# Patient Record
(demographics unavailable — no encounter records)

---

## 2025-01-08 NOTE — PROCEDURE
[FreeTextEntry3] : Large joint injection was performed of the left knee. An injection of Lidocaine 3cc of 1% , Bupivacaine (Marcaine) 6cc of 0.5% , Triamcinolone (Kenalog) 2cc of 40 mg  was used.  Patient was advised to call if redness, pain or fever occur and apply ice for 15 minutes out of every hour for the next 12-24 hours as tolerated.   Patient has tried OTC's including aspirin, Ibuprofen, Aleve, etc or prescription NSAIDS, and/or exercises at home and/or physical therapy without satisfactory response, patient had decreased mobility in the joint and the risks benefits, and alternatives have been discussed, and verbal consent was obtained.  The site was prepped with alcohol, betadine and ethyl chloride sprayed topically  The risks, benefits and contents of the injection have been discussed.  Risks include but are not limited to allergic reaction, flare reaction, permanent white skin discoloration at the injection site and infection.  The patient understands the risks and agrees to having the injection.  All questions have been answered.

## 2025-01-08 NOTE — DISCUSSION/SUMMARY
[de-identified] : 71f with acute exacerbation of left knee djd.  The patient was advised of the diagnosis. The natural history of the pathology was explained in full to the patient in layman's terms. All questions were answered. The risks and benefits of surgical and non-surgical treatment alternatives were explained in full to the patient.  1) csi left knee today - tolerated well 2) discussed availability of visco injections and pt would like to proceed.  3) cryotherapy, rest and activity modification  4) will rtc after an upcoming trip for Euflexxa injections    Entered by Mary Auguste acting as scribe. Dr. Delong- The documentation recorded by the scribe accurately reflects the service I personally performed and the decisions made by me.

## 2025-01-08 NOTE — PHYSICAL EXAM
[Left] : left knee [NL (140)] : flexion 140 degrees [NL (0)] : extension 0 degrees [Negative] : negative Shaniqua's [] : no pain with varus stress

## 2025-01-08 NOTE — HISTORY OF PRESENT ILLNESS
[Work related] : work related [Sudden] : sudden [5] : 5 [Dull/Aching] : dull/aching [Throbbing] : throbbing [Intermittent] : intermittent [Leisure] : leisure [Work] : work [Part time] : Work status: part time [de-identified] : 1/8/25: Here to f/up left knee. States her  case is now closed so she will be under her insurance moving forward for the left knee. Knee pain has persisted since last visit with no new JERZY. Has not been to PT.    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 C/o continued left knee pain and stiffness. Pain limits her walking distance and activity levels.  5/7/24: Here to f/up left knee. CSI at last visit with relief. Attending PT and wearing playmaker brace which she finds to be helpful.  3/7/24: Here to f/up left knee and left ankle. Attending PT. Feels that she has seen good improvement with her left ankle pain. c/o persistent left knee pain, swelling and instability.  01/25/2024 Ms. ELIDIA MORALES, a 70 year old female, presents today for left knee / left ankle.  In parking lot at work on 12/18/23, tripped over the curb and fell. She had pain in the ankle and knee. Hx of knee osteoarthritis and has been treated in the last year and had been feeling well until the fall. Had XR done @ [facility] and were negative for fx and was told she had an ankle sprain. Still aching with her bending, steps or when getting up from seated position.   [] : no [FreeTextEntry1] : LT Knee and Lt ankle [FreeTextEntry3] : 12/18/23 [FreeTextEntry5] : follow up visit. needs new PT rx today to started going again. wearing playmaker, reports more swelling of LT knee and feels like it is going to buckle often. Also reports some numbness of lower leg and LT hip discomfort for about one month.  [de-identified] : 11/2023 [de-identified] : TANESHA

## 2025-01-22 NOTE — HISTORY OF PRESENT ILLNESS
[Lower back] : lower back [Gradual] : gradual [4] : 4 [] : yes [Intermittent] : intermittent [Household chores] : household chores [Sleep] : sleep [Rest] : rest [Physical therapy] : physical therapy [Sitting] : sitting [de-identified] : 10/18/23:  70 F HERE TODAY WITH LOWER SPINE PAIN DOWN THE LEFT LEG - has had sciatica in some sense for 30+ years  PT WENT TO ER BACK IN Jan 2022 - was told sciatica and given MUSCLE RELAXER WHICH HELPS  had an MRi (at [facility]) in 2022 and was disc herniatoin and was advised PT  PT HAS ALSO TRY PT WITH SOME RELIEF but HAVING persistent NUMBNESS ON THE LEFT LEG   xrays today: l spine - lumbar scoliosis 28 degrees, spondylosis  AP PELVIS-  negative   SAw Dr torrez and had an MRI of the left knee   no prior spine surgery tried chiro with some relief  no prior pain injectoins  no gabapentin  no emg/ncs   retired but works 3 days a week   11/01/23 here to review mri results on the lower spine, no changes - plan at last was "reviewed the case and the imaging with her  left lumbar radiculopathy  indicated for updated MRi L spine   fu to review" - overall the pain remains in the lower back and down the left leg - right leg is okay uses tylenol with advil   MRI L-spine 10/25/23 See attached report she was aware of kidney cyst  ----------------------------------------------------------------------------------  1.22.25 Patient here for lower back pain. Reports sciatica pain down the left leg that worsened over the last few months without injury. There is tingling into the toes in the left foot.  Has been taking tylenol as the advil has raised her BP.    had an injection with Dr Torrez in the left knee   There is also pain to the neck that started in 12/2024. She wakes up with the back of her neck hurting her and has a headache. No radiating pain into the arms.  She saw me over a year ago and was prescribed gabapentin but didnt take it   No prior neck issues  No new mRI  xrays today C-spine 4v - cervical spondylosis  L-spine 4v - lumbar scoliosis and spondylosis  AP pelvis - no visible fractures  [FreeTextEntry5] : NO INJURY  [FreeTextEntry7] : LEFT LEG  [de-identified] : MRI DONE AT [facility] ,mri done at [facility]  [de-identified] : PT

## 2025-01-22 NOTE — PHYSICAL EXAM
[Left lower extremity below knee] : left lower extremity below knee [] : negative Spurling [FreeTextEntry8] : tender to the back of the neck

## 2025-01-22 NOTE — DISCUSSION/SUMMARY
[Medication Risks Reviewed] : Medication risks reviewed [de-identified] : reviewed the case and the imaging with her neck and lower back issues indicated for MRi of both TPI toelrated well today PT script  fu to reveiw the MRis

## 2025-01-22 NOTE — PROCEDURE
[Trigger point 1-2 muscle groups] : trigger point 1-2 muscle groups [Left] : of the left [Lumbar paraspinal muscle] : lumbar paraspinal muscle [Pain] : pain [Alcohol] : alcohol [Betadine] : betadine [___ cc    1%] : Lidocaine ~Vcc of 1%  [___ cc    0.25%] : Bupivacaine (Marcaine) ~Vcc of 0.25%  [___ cc    10mg] : Triamcinolone (Kenalog) ~Vcc of 10 mg  [FreeTextEntry3] : the pain was 8 before and 4 after the injection

## 2025-02-05 NOTE — DISCUSSION/SUMMARY
[Medication Risks Reviewed] : Medication risks reviewed [de-identified] : reviewed the case and the imaging with her  neck and lower back issues cont  reviewed MRIs of both cont with PT - we can refill the script if needed

## 2025-02-05 NOTE — HISTORY OF PRESENT ILLNESS
[Lower back] : lower back [Gradual] : gradual [4] : 4 [] : yes [Intermittent] : intermittent [Household chores] : household chores [Rest] : rest [Sleep] : sleep [Physical therapy] : physical therapy [Sitting] : sitting [de-identified] : 10/18/23:  70 F HERE TODAY WITH LOWER SPINE PAIN DOWN THE LEFT LEG - has had sciatica in some sense for 30+ years  PT WENT TO ER BACK IN Jan 2022 - was told sciatica and given MUSCLE RELAXER WHICH HELPS  had an MRi (at [facility]) in 2022 and was disc herniatoin and was advised PT  PT HAS ALSO TRY PT WITH SOME RELIEF but HAVING persistent NUMBNESS ON THE LEFT LEG   xrays today: l spine - lumbar scoliosis 28 degrees, spondylosis  AP PELVIS-  negative   SAw Dr torrez and had an MRI of the left knee   no prior spine surgery tried chiro with some relief  no prior pain injectoins  no gabapentin  no emg/ncs   retired but works 3 days a week   11/01/23 here to review mri results on the lower spine, no changes - plan at last was "reviewed the case and the imaging with her  left lumbar radiculopathy  indicated for updated MRi L spine   fu to review" - overall the pain remains in the lower back and down the left leg - right leg is okay uses tylenol with advil   MRI L-spine 10/25/23 See attached report she was aware of kidney cyst  ----------------------------------------------------------------------------------  1.22.25 Patient here for lower back pain. Reports sciatica pain down the left leg that worsened over the last few months without injury. There is tingling into the toes in the left foot.  Has been taking tylenol as the advil has raised her BP.    had an injection with Dr Torrez in the left knee   There is also pain to the neck that started in 12/2024. She wakes up with the back of her neck hurting her and has a headache. No radiating pain into the arms.  She saw me over a year ago and was prescribed gabapentin but didnt take it   No prior neck issues  No new mRI  xrays today C-spine 4v - cervical spondylosis  L-spine 4v - lumbar scoliosis and spondylosis  AP pelvis - no visible fractures   02.05.2025: Patient present today for follow-up of the neck and lower back. Underwent MRI and is here to discuss the imaging results. Since last visit, pt has been feeling the same. neck pain radiating to the left shoulder   MRi C and L spine - see reports - [facility] by my read - C spine - mild spondylosis L spine - mild to mod diffuse spondylosis  injectoins and steroids helped a bit has done some PT  [FreeTextEntry5] : NO INJURY  [FreeTextEntry7] : LEFT LEG  [de-identified] : MRI DONE AT [facility] ,mri done at [facility]  [de-identified] : PT

## 2025-04-22 NOTE — PROCEDURE
[FreeTextEntry3] : Large joint injection was performed  of the left knee. The indication for this procedure was x-ray evidence of Osteoarthritis on this or prior visit. The site was prepped with alcohol and betadine. An injection of Lidocaine 3cc of 1% , Euflexxa 20mg, # 1 was used.   Patient was advised to call if redness, pain or fever occur and apply ice for 15 minutes out of every hour for the next 12-24 hours as tolerated.   Patient has tried OTC's including aspirin, Ibuprofen, Aleve, etc or prescription NSAIDS, and/or exercises at home and/or physical therapy without satisfactory response, patient had decreased mobility in the joint and the risks benefits, and alternatives have been discussed, and verbal consent was obtained.   The risks, benefits and contents of the injection have been discussed.  Risks include but are not limited to allergic reaction, flare reaction, permanent white skin discoloration at the injection site and infection.  The patient understands the risks and agrees to having the injection.  All questions have been answered.

## 2025-04-22 NOTE — HISTORY OF PRESENT ILLNESS
[Work related] : work related [Sudden] : sudden [5] : 5 [Dull/Aching] : dull/aching [Throbbing] : throbbing [Intermittent] : intermittent [Leisure] : leisure [Work] : work [Part time] : Work status: part time [de-identified] : 4/22/25: Here to f/u L knee. Pain has remained the same since last visit and increases at times. Experiencing weakness and swelling with the left knee. CSI last visit helped temporarily. N/t from left knee down to L toes. Completed PT and has continued with HEP.  1/8/25: Here to f/up left knee. States her  case is now closed so she will be under her insurance moving forward for the left knee. Knee pain has persisted since last visit with no new JERZY. Has not been to PT.    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 C/o continued left knee pain and stiffness. Pain limits her walking distance and activity levels.  5/7/24: Here to f/up left knee. CSI at last visit with relief. Attending PT and wearing playmaker brace which she finds to be helpful.  3/7/24: Here to f/up left knee and left ankle. Attending PT. Feels that she has seen good improvement with her left ankle pain. c/o persistent left knee pain, swelling and instability.  01/25/2024 Ms. ELIDIA MORALES, a 70 year old female, presents today for left knee / left ankle.  In parking lot at work on 12/18/23, tripped over the curb and fell. She had pain in the ankle and knee. Hx of knee osteoarthritis and has been treated in the last year and had been feeling well until the fall. Had XR done @ [facility] and were negative for fx and was told she had an ankle sprain. Still aching with her bending, steps or when getting up from seated position.   [] : no [FreeTextEntry1] : LT Knee and Lt ankle [FreeTextEntry3] : 12/18/23

## 2025-04-22 NOTE — DISCUSSION/SUMMARY
[de-identified] : 71f with acute exacerbation of left knee djd.  Arthritis is a progressive problem that once occurs will be a chronic issue that will likely continue until surgical treatment is necessary. Treatment options for arthritis include OTC NSAIDS, prescription NSAIDS, ice, bracing, physical therapy, cortisone and/or visco injections, and surgery for joint replacement. s/p csi 1/8/25 with temporary relief.   discussed availability of visco injections and pt would like to proceed. Euflexxa #1 Injection tolerated well. Post injection instructions reviewed. 1) wbat, cryotherapy 2) rtc 1 week to continue series.   Entered by Mary Auguste acting as scribe. Dr. Delong- The documentation recorded by the scribe accurately reflects the service I personally performed and the decisions made by me.

## 2025-04-28 NOTE — DISCUSSION/SUMMARY
[de-identified] : 71f with acute exacerbation of left knee djd.  Arthritis is a progressive problem that once occurs will be a chronic issue that will likely continue until surgical treatment is necessary. Treatment options for arthritis include OTC NSAIDS, prescription NSAIDS, ice, bracing, physical therapy, cortisone and/or visco injections, and surgery for joint replacement. s/p csi 1/8/25 with temporary relief.   discussed availability of visco injections and pt would like to proceed. Euflexxa #2 Injection tolerated well. Post injection instructions reviewed. 1) wbat, cryotherapy 2) rtc 1 week to continue series.   Entered by Mary Auguste acting as scribe. Dr. Delong- The documentation recorded by the scribe accurately reflects the service I personally performed and the decisions made by me.

## 2025-04-28 NOTE — PROCEDURE
[FreeTextEntry3] : Large joint injection was performed  of the left knee. The indication for this procedure was x-ray evidence of Osteoarthritis on this or prior visit. The site was prepped with alcohol and betadine. An injection of Lidocaine 3cc of 1% , Euflexxa 20mg, # 2 was used.   Patient was advised to call if redness, pain or fever occur and apply ice for 15 minutes out of every hour for the next 12-24 hours as tolerated.   Patient has tried OTC's including aspirin, Ibuprofen, Aleve, etc or prescription NSAIDS, and/or exercises at home and/or physical therapy without satisfactory response, patient had decreased mobility in the joint and the risks benefits, and alternatives have been discussed, and verbal consent was obtained.   The risks, benefits and contents of the injection have been discussed.  Risks include but are not limited to allergic reaction, flare reaction, permanent white skin discoloration at the injection site and infection.  The patient understands the risks and agrees to having the injection.  All questions have been answered.

## 2025-04-28 NOTE — HISTORY OF PRESENT ILLNESS
[Work related] : work related [Sudden] : sudden [5] : 5 [Dull/Aching] : dull/aching [Throbbing] : throbbing [Intermittent] : intermittent [Leisure] : leisure [Work] : work [Part time] : Work status: part time [de-identified] : 4/28/25- here for euflexxa #2, reports improvement since last visit 4/22/25: Here to f/u L knee. Pain has remained the same since last visit and increases at times. Experiencing weakness and swelling with the left knee. CSI last visit helped temporarily. N/t from left knee down to L toes. Completed PT and has continued with HEP.  1/8/25: Here to f/up left knee. States her  case is now closed so she will be under her insurance moving forward for the left knee. Knee pain has persisted since last visit with no new JERZY. Has not been to PT.    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 C/o continued left knee pain and stiffness. Pain limits her walking distance and activity levels.  5/7/24: Here to f/up left knee. CSI at last visit with relief. Attending PT and wearing playmaker brace which she finds to be helpful.  3/7/24: Here to f/up left knee and left ankle. Attending PT. Feels that she has seen good improvement with her left ankle pain. c/o persistent left knee pain, swelling and instability.  01/25/2024 Ms. ELIDIA MORALES, a 70 year old female, presents today for left knee / left ankle.  In parking lot at work on 12/18/23, tripped over the curb and fell. She had pain in the ankle and knee. Hx of knee osteoarthritis and has been treated in the last year and had been feeling well until the fall. Had XR done @ [facility] and were negative for fx and was told she had an ankle sprain. Still aching with her bending, steps or when getting up from seated position.   [] : no [FreeTextEntry1] : LT Knee and Lt ankle [FreeTextEntry3] : 12/18/23

## 2025-05-09 NOTE — DISCUSSION/SUMMARY
[de-identified] : 71f with acute exacerbation of left knee djd.  Arthritis is a progressive problem that once occurs will be a chronic issue that will likely continue until surgical treatment is necessary. Treatment options for arthritis include OTC NSAIDS, prescription NSAIDS, ice, bracing, physical therapy, cortisone and/or visco injections, and surgery for joint replacement. s/p csi 1/8/25 with temporary relief.   discussed availability of visco injections and pt would like to proceed. Euflexxa #3 Injection tolerated well. Post injection instructions reviewed. 1) wbat, cryotherapy 2) rtc prn - discussed timing of repeat injections  Entered by Mary Auguste acting as scribe. Dr. Delong- The documentation recorded by the scribe accurately reflects the service I personally performed and the decisions made by me.

## 2025-05-09 NOTE — PROCEDURE
[FreeTextEntry3] : Large joint injection was performed  of the left knee. The indication for this procedure was x-ray evidence of Osteoarthritis on this or prior visit. The site was prepped with alcohol and betadine. An injection of Lidocaine 3cc of 1% , Euflexxa 20mg, # 3 was used.   Patient was advised to call if redness, pain or fever occur and apply ice for 15 minutes out of every hour for the next 12-24 hours as tolerated.   Patient has tried OTC's including aspirin, Ibuprofen, Aleve, etc or prescription NSAIDS, and/or exercises at home and/or physical therapy without satisfactory response, patient had decreased mobility in the joint and the risks benefits, and alternatives have been discussed, and verbal consent was obtained.   The risks, benefits and contents of the injection have been discussed.  Risks include but are not limited to allergic reaction, flare reaction, permanent white skin discoloration at the injection site and infection.  The patient understands the risks and agrees to having the injection.  All questions have been answered.

## 2025-05-09 NOTE — HISTORY OF PRESENT ILLNESS
[Work related] : work related [Sudden] : sudden [5] : 5 [Dull/Aching] : dull/aching [Throbbing] : throbbing [Intermittent] : intermittent [Leisure] : leisure [Work] : work [Part time] : Work status: part time [de-identified] : 05/07/25: Pt here for Euflexxa #3. Pt states she feels some relief and feels stronger when walking. 4/28/25- here for euflexxa #2, reports improvement since last visit 4/22/25: Here to f/u L knee. Pain has remained the same since last visit and increases at times. Experiencing weakness and swelling with the left knee. CSI last visit helped temporarily. N/t from left knee down to L toes. Completed PT and has continued with HEP.  1/8/25: Here to f/up left knee. States her WC case is now closed so she will be under her insurance moving forward for the left knee. Knee pain has persisted since last visit with no new JERZY. Has not been to PT.    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 C/o continued left knee pain and stiffness. Pain limits her walking distance and activity levels.  5/7/24: Here to f/up left knee. CSI at last visit with relief. Attending PT and wearing playmaker brace which she finds to be helpful.  3/7/24: Here to f/up left knee and left ankle. Attending PT. Feels that she has seen good improvement with her left ankle pain. c/o persistent left knee pain, swelling and instability.  01/25/2024 Ms. ELIDIA MORALES, a 70 year old female, presents today for left knee / left ankle.  In parking lot at work on 12/18/23, tripped over the curb and fell. She had pain in the ankle and knee. Hx of knee osteoarthritis and has been treated in the last year and had been feeling well until the fall. Had XR done @ [facility] and were negative for fx and was told she had an ankle sprain. Still aching with her bending, steps or when getting up from seated position.   [] : no [FreeTextEntry1] : LT Knee and Lt ankle [FreeTextEntry3] : 12/18/23

## 2025-07-24 NOTE — IMAGING
[Left] : left shoulder [FreeTextEntry1] : 2 views (AP, ER) obtained and interpreted on 07/24/2025. There are no fractures, subluxations or dislocations. No significant abnormalities are seen.  [FreeTextEntry5] :  2 views (Y view and Axillary) obtained and interpreted on 07/24/2025. There are no fractures, subluxations or dislocations. No significant abnormalities are seen.

## 2025-07-24 NOTE — PHYSICAL EXAM
[Left] : left shoulder [NL (0-70)] : full internal rotation 0-70 degrees [] : negative Speed's [TWNoteComboBox7] : active forward flexion 170 degrees [de-identified] : active abduction 120 degrees [TWNoteComboBox9] : passive abduction 150 degrees [de-identified] : external rotation at 90 degrees of abduction 90 degrees

## 2025-07-24 NOTE — HISTORY OF PRESENT ILLNESS
[8] : 8 [0] : 0 [de-identified] : 07/24/2025 Ms. ELIDIA MORALES, a 72-year-old female, presents today for L shoulder pain. No known injury/fall. Experiencing sharp pain with certain movements for 3-4 weeks. Feels pain may have been aggravated with increased activity which including lifting/carrying packing up her house to move. Has applied ice/bio freeze.  [] : no [FreeTextEntry1] : L shoulder

## 2025-07-24 NOTE — PROCEDURE
[FreeTextEntry3] :  Large joint injection was performed of the left shoulder. An injection of Lidocaine 3cc of 1% , Bupivacaine (Marcaine) 6cc of 0.5% , Triamcinolone (Kenalog) 2cc of 40 mg was used. Patient was advised to call if redness, pain or fever occur and apply ice for 15 minutes out of every hour for the next 12-24 hours as tolerated.   Patient has tried OTC's including aspirin, Ibuprofen, Aleve, etc or prescription NSAIDS, and/or exercises at home and/or physical therapy without satisfactory response, patient had decreased mobility in the joint and the risks benefits, and alternatives have been discussed, and verbal consent was obtained.   The site was prepped with alcohol, betadine and ethyl chloride sprayed topically   The risks, benefits and contents of the injection have been discussed. Risks include but are not limited to allergic reaction, flare reaction, permanent white skin discoloration at the injection site and infection. The patient understands the risks and agrees to having the injection. All questions have been answered.   A diagnostic ultrasound is necessary prior to the subacromial injection to identify possible rotator cuff and biceps pathology. If there is an identified full thickness rotator cuff tear, the injection would not be performed, and the patient would instead be sent for an MRI evaluation for preoperative planning. Likewise, identification of biceps pathology such as high-grade partial thickness tearing or instability would result in different placement of the injection to improve possible efficacy. The transducer was placed in the axial plane of the proximal humerus. The greater and lesser tuberosities were identified as normal and the biceps tendon is without tear and properly seated in the bicipital groove.  The shoulder joint was then placed in extension and internal rotation. The transducer was placed in the coronal plane of the proximal humerus. The supraspinatus tendon was identified at its insertion on the greater tuberosity and found to no tear on the bursal surface of the tendon   Ultrasonic guidance separate from the diagnostic ultrasound would be necessary to ensure proper placement of the needle and medication into the correct space/structures. Inadvertent injection into the substance of the rotator cuff tendon has been shown in several studies to result in potential damage to the tendon. Multiple studies have confirmed the improved accuracy of placing the needle correctly into the subacromial space when ultrasound guidance is used.   All ultrasound images have been permanently captured and stored accordingly in our picture archiving and communication system.

## 2025-07-24 NOTE — DISCUSSION/SUMMARY
[de-identified] : 72 F with left shoulder impingement and bursitis The patient was advised of the diagnosis. The natural history of the pathology was explained in full to the patient in layman's terms. All questions were answered.  1) CSI left shoulder today - tolerated well 2) cryotherapy, rest and activity modification 3) home exercises provided 4) f/u prn   Entered by Mary Auguste acting as scribe. Dr. Delong- The documentation recorded by the scribe accurately reflects the service I personally performed and the decisions made by me.